# Patient Record
Sex: FEMALE | NOT HISPANIC OR LATINO | Employment: UNEMPLOYED | URBAN - METROPOLITAN AREA
[De-identification: names, ages, dates, MRNs, and addresses within clinical notes are randomized per-mention and may not be internally consistent; named-entity substitution may affect disease eponyms.]

---

## 2017-02-28 ENCOUNTER — COMMUNICATION - HEALTHEAST (OUTPATIENT)
Dept: TELEHEALTH | Facility: CLINIC | Age: 27
End: 2017-02-28

## 2017-02-28 ENCOUNTER — OFFICE VISIT - HEALTHEAST (OUTPATIENT)
Dept: FAMILY MEDICINE | Facility: CLINIC | Age: 27
End: 2017-02-28

## 2017-02-28 DIAGNOSIS — N75.1 BARTHOLIN'S GLAND ABSCESS: ICD-10-CM

## 2017-04-17 ENCOUNTER — AMBULATORY - HEALTHEAST (OUTPATIENT)
Dept: FAMILY MEDICINE | Facility: CLINIC | Age: 27
End: 2017-04-17

## 2017-04-17 ENCOUNTER — COMMUNICATION - HEALTHEAST (OUTPATIENT)
Dept: FAMILY MEDICINE | Facility: CLINIC | Age: 27
End: 2017-04-17

## 2017-04-17 DIAGNOSIS — R06.83 SNORING: ICD-10-CM

## 2017-04-18 ENCOUNTER — OFFICE VISIT - HEALTHEAST (OUTPATIENT)
Dept: FAMILY MEDICINE | Facility: CLINIC | Age: 27
End: 2017-04-18

## 2017-04-18 DIAGNOSIS — R06.83 SNORING: ICD-10-CM

## 2017-04-18 DIAGNOSIS — Z00.00 HEALTHCARE MAINTENANCE: ICD-10-CM

## 2017-05-15 ENCOUNTER — OFFICE VISIT - HEALTHEAST (OUTPATIENT)
Dept: SLEEP MEDICINE | Facility: CLINIC | Age: 27
End: 2017-05-15

## 2017-05-15 DIAGNOSIS — R06.83 SNORING: ICD-10-CM

## 2017-05-15 DIAGNOSIS — R29.818 SUSPECTED SLEEP APNEA: ICD-10-CM

## 2017-05-15 DIAGNOSIS — G47.10 HYPERSOMNIA, UNSPECIFIED: ICD-10-CM

## 2017-05-15 DIAGNOSIS — G47.8 SLEEP DYSFUNCTION WITH SLEEP STAGE DISTURBANCE: ICD-10-CM

## 2017-05-15 ASSESSMENT — MIFFLIN-ST. JEOR: SCORE: 1631.02

## 2017-05-30 ENCOUNTER — RECORDS - HEALTHEAST (OUTPATIENT)
Dept: SLEEP MEDICINE | Age: 27
End: 2017-05-30

## 2017-05-30 DIAGNOSIS — R06.83 SNORING: ICD-10-CM

## 2017-05-30 DIAGNOSIS — G47.10 HYPERSOMNIA, UNSPECIFIED: ICD-10-CM

## 2017-05-30 DIAGNOSIS — G47.8 OTHER SLEEP DISORDERS: ICD-10-CM

## 2017-05-30 DIAGNOSIS — G47.30 SLEEP APNEA, UNSPECIFIED: ICD-10-CM

## 2017-06-01 ENCOUNTER — COMMUNICATION - HEALTHEAST (OUTPATIENT)
Dept: SLEEP MEDICINE | Facility: CLINIC | Age: 27
End: 2017-06-01

## 2017-08-21 ENCOUNTER — COMMUNICATION - HEALTHEAST (OUTPATIENT)
Dept: FAMILY MEDICINE | Facility: CLINIC | Age: 27
End: 2017-08-21

## 2017-08-21 DIAGNOSIS — B85.0 HEAD LICE: ICD-10-CM

## 2017-12-14 ENCOUNTER — RECORDS - HEALTHEAST (OUTPATIENT)
Dept: ADMINISTRATIVE | Facility: OTHER | Age: 27
End: 2017-12-14

## 2018-01-23 ENCOUNTER — RECORDS - HEALTHEAST (OUTPATIENT)
Dept: ADMINISTRATIVE | Facility: OTHER | Age: 28
End: 2018-01-23

## 2018-03-02 ENCOUNTER — RECORDS - HEALTHEAST (OUTPATIENT)
Dept: ADMINISTRATIVE | Facility: OTHER | Age: 28
End: 2018-03-02

## 2018-03-03 ENCOUNTER — RECORDS - HEALTHEAST (OUTPATIENT)
Dept: ADMINISTRATIVE | Facility: OTHER | Age: 28
End: 2018-03-03

## 2018-03-04 ENCOUNTER — RECORDS - HEALTHEAST (OUTPATIENT)
Dept: ADMINISTRATIVE | Facility: OTHER | Age: 28
End: 2018-03-04

## 2018-08-05 ENCOUNTER — RECORDS - HEALTHEAST (OUTPATIENT)
Dept: ADMINISTRATIVE | Facility: OTHER | Age: 28
End: 2018-08-05

## 2018-08-07 ENCOUNTER — RECORDS - HEALTHEAST (OUTPATIENT)
Dept: ADMINISTRATIVE | Facility: OTHER | Age: 28
End: 2018-08-07

## 2018-09-20 ENCOUNTER — OFFICE VISIT - HEALTHEAST (OUTPATIENT)
Dept: FAMILY MEDICINE | Facility: CLINIC | Age: 28
End: 2018-09-20

## 2018-09-20 DIAGNOSIS — R00.0 TACHYCARDIA: ICD-10-CM

## 2018-09-20 DIAGNOSIS — E05.90 HYPERTHYROIDISM: ICD-10-CM

## 2018-09-20 LAB
ALBUMIN SERPL-MCNC: 3.4 G/DL (ref 3.5–5)
ALP SERPL-CCNC: 114 U/L (ref 45–120)
ALT SERPL W P-5'-P-CCNC: 25 U/L (ref 0–45)
ANION GAP SERPL CALCULATED.3IONS-SCNC: 10 MMOL/L (ref 5–18)
AST SERPL W P-5'-P-CCNC: 27 U/L (ref 0–40)
ATRIAL RATE - MUSE: 96 BPM
BILIRUB SERPL-MCNC: 0.4 MG/DL (ref 0–1)
BUN SERPL-MCNC: 7 MG/DL (ref 8–22)
CALCIUM SERPL-MCNC: 9.8 MG/DL (ref 8.5–10.5)
CHLORIDE BLD-SCNC: 103 MMOL/L (ref 98–107)
CO2 SERPL-SCNC: 23 MMOL/L (ref 22–31)
CREAT SERPL-MCNC: 0.54 MG/DL (ref 0.6–1.1)
DIASTOLIC BLOOD PRESSURE - MUSE: NORMAL MMHG
ERYTHROCYTE [DISTWIDTH] IN BLOOD BY AUTOMATED COUNT: 10.8 % (ref 11–14.5)
GFR SERPL CREATININE-BSD FRML MDRD: >60 ML/MIN/1.73M2
GLUCOSE BLD-MCNC: 147 MG/DL (ref 70–125)
HCT VFR BLD AUTO: 42.7 % (ref 35–47)
HGB BLD-MCNC: 14.5 G/DL (ref 12–16)
INTERPRETATION ECG - MUSE: NORMAL
MCH RBC QN AUTO: 27 PG (ref 27–34)
MCHC RBC AUTO-ENTMCNC: 34 G/DL (ref 32–36)
MCV RBC AUTO: 79 FL (ref 80–100)
P AXIS - MUSE: 59 DEGREES
PLATELET # BLD AUTO: 252 THOU/UL (ref 140–440)
PMV BLD AUTO: 8.2 FL (ref 7–10)
POTASSIUM BLD-SCNC: 4 MMOL/L (ref 3.5–5)
PR INTERVAL - MUSE: 118 MS
PROT SERPL-MCNC: 7.3 G/DL (ref 6–8)
QRS DURATION - MUSE: 86 MS
QT - MUSE: 344 MS
QTC - MUSE: 434 MS
R AXIS - MUSE: 49 DEGREES
RBC # BLD AUTO: 5.38 MILL/UL (ref 3.8–5.4)
SODIUM SERPL-SCNC: 136 MMOL/L (ref 136–145)
SYSTOLIC BLOOD PRESSURE - MUSE: NORMAL MMHG
T AXIS - MUSE: 54 DEGREES
T4 FREE SERPL-MCNC: 3.1 NG/DL (ref 0.7–1.8)
TSH SERPL DL<=0.005 MIU/L-ACNC: <0.01 UIU/ML (ref 0.3–5)
VENTRICULAR RATE- MUSE: 96 BPM
WBC: 6.4 THOU/UL (ref 4–11)

## 2018-09-21 ENCOUNTER — COMMUNICATION - HEALTHEAST (OUTPATIENT)
Dept: FAMILY MEDICINE | Facility: CLINIC | Age: 28
End: 2018-09-21

## 2018-10-01 ENCOUNTER — COMMUNICATION - HEALTHEAST (OUTPATIENT)
Dept: CARE COORDINATION | Facility: CLINIC | Age: 28
End: 2018-10-01

## 2018-10-14 ENCOUNTER — RECORDS - HEALTHEAST (OUTPATIENT)
Dept: ADMINISTRATIVE | Facility: OTHER | Age: 28
End: 2018-10-14

## 2018-10-14 ENCOUNTER — COMMUNICATION - HEALTHEAST (OUTPATIENT)
Dept: FAMILY MEDICINE | Facility: CLINIC | Age: 28
End: 2018-10-14

## 2018-10-14 DIAGNOSIS — E05.90 HYPERTHYROIDISM: ICD-10-CM

## 2018-10-17 ENCOUNTER — RECORDS - HEALTHEAST (OUTPATIENT)
Dept: ADMINISTRATIVE | Facility: OTHER | Age: 28
End: 2018-10-17

## 2018-11-11 ENCOUNTER — RECORDS - HEALTHEAST (OUTPATIENT)
Dept: ADMINISTRATIVE | Facility: OTHER | Age: 28
End: 2018-11-11

## 2018-11-13 ENCOUNTER — RECORDS - HEALTHEAST (OUTPATIENT)
Dept: ADMINISTRATIVE | Facility: OTHER | Age: 28
End: 2018-11-13

## 2018-11-24 ENCOUNTER — RECORDS - HEALTHEAST (OUTPATIENT)
Dept: ADMINISTRATIVE | Facility: OTHER | Age: 28
End: 2018-11-24

## 2018-11-27 ENCOUNTER — OFFICE VISIT - HEALTHEAST (OUTPATIENT)
Dept: FAMILY MEDICINE | Facility: CLINIC | Age: 28
End: 2018-11-27

## 2018-11-27 DIAGNOSIS — F41.1 GENERALIZED ANXIETY DISORDER: ICD-10-CM

## 2018-11-27 DIAGNOSIS — F33.1 MODERATE EPISODE OF RECURRENT MAJOR DEPRESSIVE DISORDER (H): ICD-10-CM

## 2018-11-27 DIAGNOSIS — F90.9 ATTENTION DEFICIT HYPERACTIVITY DISORDER (ADHD), UNSPECIFIED ADHD TYPE: ICD-10-CM

## 2018-11-27 DIAGNOSIS — E05.90 HYPERTHYROIDISM: ICD-10-CM

## 2018-12-27 ENCOUNTER — RECORDS - HEALTHEAST (OUTPATIENT)
Dept: ADMINISTRATIVE | Facility: OTHER | Age: 28
End: 2018-12-27

## 2019-01-28 ENCOUNTER — RECORDS - HEALTHEAST (OUTPATIENT)
Dept: ADMINISTRATIVE | Facility: OTHER | Age: 29
End: 2019-01-28

## 2019-04-10 ENCOUNTER — RECORDS - HEALTHEAST (OUTPATIENT)
Dept: ADMINISTRATIVE | Facility: OTHER | Age: 29
End: 2019-04-10

## 2019-05-31 ENCOUNTER — COMMUNICATION - HEALTHEAST (OUTPATIENT)
Dept: FAMILY MEDICINE | Facility: CLINIC | Age: 29
End: 2019-05-31

## 2019-05-31 DIAGNOSIS — E05.90 HYPERTHYROIDISM: ICD-10-CM

## 2019-07-16 ENCOUNTER — RECORDS - HEALTHEAST (OUTPATIENT)
Dept: ADMINISTRATIVE | Facility: OTHER | Age: 29
End: 2019-07-16

## 2019-07-26 ENCOUNTER — COMMUNICATION - HEALTHEAST (OUTPATIENT)
Dept: PHARMACY | Facility: CLINIC | Age: 29
End: 2019-07-26

## 2019-08-26 ENCOUNTER — COMMUNICATION - HEALTHEAST (OUTPATIENT)
Dept: PHARMACY | Facility: CLINIC | Age: 29
End: 2019-08-26

## 2019-09-16 ENCOUNTER — HOSPITAL ENCOUNTER (OUTPATIENT)
Dept: BEHAVIORAL HEALTH | Facility: CLINIC | Age: 29
Discharge: HOME | End: 2019-09-16
Attending: SOCIAL WORKER | Admitting: SOCIAL WORKER
Payer: COMMERCIAL

## 2019-09-16 VITALS
HEIGHT: 64 IN | DIASTOLIC BLOOD PRESSURE: 80 MMHG | BODY MASS INDEX: 29.02 KG/M2 | HEART RATE: 84 BPM | SYSTOLIC BLOOD PRESSURE: 119 MMHG | WEIGHT: 170 LBS

## 2019-09-16 PROCEDURE — H0001 ALCOHOL AND/OR DRUG ASSESS: HCPCS

## 2019-09-16 RX ORDER — TRAZODONE HYDROCHLORIDE 50 MG/1
50 TABLET, FILM COATED ORAL
COMMUNITY
Start: 2019-09-10

## 2019-09-16 RX ORDER — PROPRANOLOL HYDROCHLORIDE 20 MG/1
20 TABLET ORAL
COMMUNITY
Start: 2019-07-19

## 2019-09-16 RX ORDER — METHIMAZOLE 10 MG/1
10 TABLET ORAL
COMMUNITY
Start: 2019-07-19

## 2019-09-16 RX ORDER — DEXTROAMPHETAMINE SACCHARATE, AMPHETAMINE ASPARTATE, DEXTROAMPHETAMINE SULFATE AND AMPHETAMINE SULFATE 2.5; 2.5; 2.5; 2.5 MG/1; MG/1; MG/1; MG/1
10 TABLET ORAL
COMMUNITY

## 2019-09-16 RX ORDER — DEXTROAMPHETAMINE SACCHARATE, AMPHETAMINE ASPARTATE MONOHYDRATE, DEXTROAMPHETAMINE SULFATE AND AMPHETAMINE SULFATE 7.5; 7.5; 7.5; 7.5 MG/1; MG/1; MG/1; MG/1
30 CAPSULE, EXTENDED RELEASE ORAL
COMMUNITY

## 2019-09-16 RX ORDER — HYDROXYZINE PAMOATE 50 MG/1
50 CAPSULE ORAL
COMMUNITY
Start: 2019-07-19

## 2019-09-16 ASSESSMENT — ANXIETY QUESTIONNAIRES
1. FEELING NERVOUS, ANXIOUS, OR ON EDGE: NEARLY EVERY DAY
IF YOU CHECKED OFF ANY PROBLEMS ON THIS QUESTIONNAIRE, HOW DIFFICULT HAVE THESE PROBLEMS MADE IT FOR YOU TO DO YOUR WORK, TAKE CARE OF THINGS AT HOME, OR GET ALONG WITH OTHER PEOPLE: EXTREMELY DIFFICULT
6. BECOMING EASILY ANNOYED OR IRRITABLE: NEARLY EVERY DAY
3. WORRYING TOO MUCH ABOUT DIFFERENT THINGS: NEARLY EVERY DAY
GAD7 TOTAL SCORE: 19
7. FEELING AFRAID AS IF SOMETHING AWFUL MIGHT HAPPEN: NEARLY EVERY DAY
5. BEING SO RESTLESS THAT IT IS HARD TO SIT STILL: NEARLY EVERY DAY
2. NOT BEING ABLE TO STOP OR CONTROL WORRYING: SEVERAL DAYS
4. TROUBLE RELAXING: NEARLY EVERY DAY

## 2019-09-16 ASSESSMENT — MIFFLIN-ST. JEOR: SCORE: 1481.11

## 2019-09-16 ASSESSMENT — PAIN SCALES - GENERAL: PAINLEVEL: SEVERE PAIN (6)

## 2019-09-16 ASSESSMENT — PATIENT HEALTH QUESTIONNAIRE - PHQ9: SUM OF ALL RESPONSES TO PHQ QUESTIONS 1-9: 27

## 2019-09-16 NOTE — PROGRESS NOTES
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 lacks impulse control and long-term sober maintenance skills.  Pt is at a high risk for continued use.         DIMENSION VI - Recovery Environment   1. Are you employed/attending school? Tell me about that.     The last time she worked was in June 2019 for a week.    2A. Describe a typical day; evening for you. Work, school, social, leisure, volunteer, spiritual practices. Include time spent obtaining, using, recovering from drugs or alcohol. (DSM)     Current: \"sleeping.\"  Prior to hospitalization:  \"High.\"    Please describe what leisure activities have been associated with your substance abuse:     \"Stare at things, stare at the window.\"    2B. How often do you spend more time than you planned using or use more than you planned? (DSM)     It was never planned.    3. How important is using to your social connections? Do many of your family or friends use?     All of her friends still use. \"I don't really have any friends.\"    4A. Are you currently in a significant relationship?     No    4C. Sexual Orientation:     Bisexual    5A. Who do you live with?      Currently staying with her aunt, but she needs to move by Wednesday.    5B. Tell me about their alcohol/drug use and mental health issues.     No " "use.    5C. Are you concerned for your safety there? No    5D. Are you concerned about the safety of anyone else who lives with you? No    6A. Do you have children who live with you?     No    6B. Do you have children who do not live with you?     Yes- two sons 8 and 6. She no longer has CPS involvement. \"My ex- has rights. We never went to court.\"    7A. Who supports you in making changes in your alcohol or drug use? What are they willing to do to support you? Who is upset or angry about you making changes in your alcohol or drug use? How big a problem is this for you?      \"my mom.\"    7B. This table is provided to record information about the person s relationships and available support It is not necessary to ask each item; only to get a comprehensive picture of their support system.  How often can you count on the following people when you need someone?   Partner / Spouse The patient does not have a current partner or spouse.   Parent(s)/Aunt(s)/Uncle(s)/Grandparents Always supportive   Sibling(s)/Cousin(s) Rarely supportive   Child(ronaldo) Too young   Other relative(s) The patient doesn't have any other relatives.   Friend(s)/neighbor(s) The patient doesn't have any current contact with friends.   Child(ronaldo) s father(s)/mother(s) Rarely supportive   Support group member(s) The patient denied having any current involvement with 12-step or other support group meetings.   Community of dayana members The patient denied having any current involvement with community dayana members.   /counselor/therapist/healer The patient denied having any current involvement with a , counselor, therapist or healer.   Other (specify) No     8A. What is your current living situation?     She is homeless. She is currently staying at her aunt's house until Wednesday.    8B. What is your long term plan for where you will be living?     She wants to get her own house.    8C. Tell me about your living " "environment/neighborhood? Ask enough follow up questions to determine safety, criminal activity, availability of alcohol and drugs, supportive or antagonistic to the person making changes.      She is homeless.    9. Criminal justice history: Gather current/recent history and any significant history related to substance use--Arrests? Convictions? Circumstances? Alcohol or drug involvement? Sentences? Still on probation or parole? Expectations of the court? Current court order? Any sex offenses - lifetime? What level? (DSM)    None    10. What obstacles exist to participating in treatment? (Time off work, childcare, funding, transportation, pending [facility] time, living situation)     The patient denied having any obstacles for participating in substance abuse treatment.    Dimension VI Ratings   Recovery environment - The placing authority must use the criteria in Dimension VI to determine a client s recovery environment.   RISK DESCRIPTIONS - Severity ratin Client has (A) Chronically antagonistic significant other, living environment, family, peer group or long-term criminal justice involvement that is harmful to recovery or treatment progress, or (B) Client has an actively antagonistic significant other, family, work, or living environment with immediate threat to the client's safety and well-being.    REASONS SEVERITY WAS ASSIGNED - (What support does the person have for making changes? What structure/stability does the person have in his or her daily life that will increase the likelihood that changes can be sustained? What problems exist in the person s environment that will jeopardize getting/staying clean and sober?)     The patient is currently homeless. She is staying with her mom at her aunt's house and they both have to move out on Wednesday. The patient reported having a relationship conflict with \"my mom, my kid's dad, my ex\" due to her ongoing substance abuse issues. The patient identified as being " bisexual and she denied being in a romantic relationship at this time. The patient denied having a history of legal issues. The patient is unemployed and she last worked in June 2019 for a week. She realized she couldn't work due to her drug use. The patient reported having some increased financial stress due to not working. The patient lacks a current sober peer support network.         Client Choice/Exceptions   Would you like services specific to language, age, gender, culture, Voodoo preference, race, ethnicity, sexual orientation or disability?  Yes - [facility]    What particular treatment choices and options would you like to have? [facility] IP    Do you have a preference for a particular treatment program? [facility]    Criteria for Diagnosis     Criteria for Diagnosis  DSM-5 Criteria for Substance Use Disorder  Instructions: Determine whether the client currently meets the criteria for Substance Use Disorder using the diagnostic criteria in the DSM-V pp.481-589. Current means during the most recent 12 months outside a facility that controls access to substances    Category of Substance Severity (ICD-10 Code / DSM 5 Code)     Alcohol Use Disorder Severe  (10.20) (303.90)   Cannabis Use Disorder Severe   (F12.20) (304.30)   Hallucinogen Use Disorder The patient does not meet the criteria for a Hallucinogen use disorder.   Inhalant Use Disorder The patient does not meet the criteria for an Inhalant use disorder.   Opioid Use Disorder The patient does not meet the criteria for an Opioid use disorder.   Sedative, Hypnotic, or Anxiolytic Use Disorder The patient does not meet the criteria for a Sedative/Hypnotic use disorder.   Stimulant Related Disorder Severe   (F15.20) (304.40) Amphetamine type substance   Tobacco Use Disorder Moderate   (F17.200) (305.1)   Other (or unknown) Substance Use Disorder The patient does not meet the criteria for a Other (or unknown) Substance use disorder.       Collateral Contact  Summary   Number of contacts made: 1    Contact with referring person:  No    If court related records were reviewed, summarize here: No court records had been reviewed at the time of this documentation.    Information from collateral contacts supported/largely agreed with information from the client and associated risk ratings.      Rule 25 Assessment Summary and Plan   's Recommendation    1)  Complete a [facility] residential based or similar treatment program, such as [facility]Occ[facility].   2)  Abstain from all mood-altering chemicals unless prescribed by a licensed provider.   3)  Attend, at minimum, 2 weekly support group meetings, such as 12 step based (AA/NA), SMART Recovery, Health Realizations, and/or Refuge Recovery meetings.     4)  Actively work with a female mentor/sponsor on a weekly basis.   5)  Follow all the recommendations of your treatment/medical providers.      Collateral Contacts     Name:    Mell French   Relationship:    mother   Phone Number:    [phone] Releases:    Yes     Mell filled out the Concerned Person Information Sheet. She has been concerned about pt's drug use for the past year. She has been concerned about pt's mental health since she was 12 years old.  She doesn't know any specifics about pt's meth use.  ollateral Contacts      A problematic pattern of alcohol/drug use leading to clinically significant impairment or distress, as manifested by at least two of the following, occurring within a 12-month period:    1.) Alcohol/drug is often taken in larger amounts or over a longer period than was intended.  2.) There is a persistent desire or unsuccessful efforts to cut down or control alcohol/drug use  3.) A great deal of time is spent in activities necessary to obtain alcohol, use alcohol, or recover from its effects.  4.) Craving, or a strong desire or urge to use alcohol/drug  5.) Recurrent alcohol/drug use resulting in a failure to fulfill  major role obligations at work, school or home.  6.) Continued alcohol use despite having persistent or recurrent social or interpersonal problems caused or exacerbated by the effects of alcohol/drug.  7.) Important social, occupational, or recreational activities are given up or reduced because of alcohol/drug use.  8.) Recurrent alcohol/drug use in situations in which it is physically hazardous.  9.) Alcohol/drug use is continued despite knowledge of having a persistent or recurrent physical or psychological problem that is likely to have been caused or exacerbated by alcohol.  10.) Tolerance, as defined by either of the following: A need for markedly increased amounts of alcohol/drug to achieve intoxication or desired effect.  11.) Withdrawal, as manifested by either of the following: The characteristic withdrawal syndrome for alcohol/drug (refer to Criteria A and B of the criteria set for alcohol/drug withdrawal).      Specify if: In early remission:  After full criteria for alcohol/drug use disorder were previously met, none of the criteria for alcohol/drug use disorder have been met for at least 3 months but for less than 12 months (with the exception that Criterion A4,  Craving or a strong desire or urge to use alcohol/drug  may be met).     In sustained remission:   After full criteria for alcohol use disorder were previously met, non of the criteria for alcohol/drug use disorder have been met at any time during a period of 12 months or longer (with the exception that Criterion A4,  Craving or strong desire or urge to use alcohol/drug  may be met).   Specify if:   This additional specifier is used if the individual is in an environment where access to alcohol is restricted.    Mild: Presence of 2-3 symptoms  Moderate: Presence of 4-5 symptoms  Severe: Presence of 6 or more symptoms
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
## 2019-09-17 ASSESSMENT — ANXIETY QUESTIONNAIRES: GAD7 TOTAL SCORE: 19

## 2019-09-22 ENCOUNTER — HOSPITAL ENCOUNTER (EMERGENCY)
Facility: CLINIC | Age: 29
Discharge: HOME | End: 2019-09-22
Attending: FAMILY MEDICINE | Admitting: FAMILY MEDICINE
Payer: COMMERCIAL

## 2019-09-22 VITALS
DIASTOLIC BLOOD PRESSURE: 78 MMHG | TEMPERATURE: 98.1 F | SYSTOLIC BLOOD PRESSURE: 108 MMHG | RESPIRATION RATE: 16 BRPM | OXYGEN SATURATION: 95 % | HEART RATE: 84 BPM

## 2019-09-22 DIAGNOSIS — E05.00 GRAVES' DISEASE: ICD-10-CM

## 2019-09-22 DIAGNOSIS — R11.2 NAUSEA AND VOMITING, INTRACTABILITY OF VOMITING NOT SPECIFIED, UNSPECIFIED VOMITING TYPE: ICD-10-CM

## 2019-09-22 LAB
ALBUMIN SERPL-MCNC: 2.9 G/DL (ref 3.4–5)
ALBUMIN UR-MCNC: NEGATIVE MG/DL
ALP SERPL-CCNC: 147 U/L (ref 40–150)
ALT SERPL W P-5'-P-CCNC: 31 U/L (ref 0–50)
ANION GAP SERPL CALCULATED.3IONS-SCNC: 7 MMOL/L (ref 3–14)
APPEARANCE UR: CLEAR
AST SERPL W P-5'-P-CCNC: 23 U/L (ref 0–45)
BASOPHILS # BLD AUTO: 0 10E9/L (ref 0–0.2)
BASOPHILS NFR BLD AUTO: 0.4 %
BILIRUB SERPL-MCNC: 0.2 MG/DL (ref 0.2–1.3)
BILIRUB UR QL STRIP: NEGATIVE
BUN SERPL-MCNC: 7 MG/DL (ref 7–30)
CALCIUM SERPL-MCNC: 8.5 MG/DL (ref 8.5–10.1)
CHLORIDE SERPL-SCNC: 106 MMOL/L (ref 94–109)
CO2 SERPL-SCNC: 27 MMOL/L (ref 20–32)
COLOR UR AUTO: YELLOW
CREAT SERPL-MCNC: 0.46 MG/DL (ref 0.52–1.04)
DIFFERENTIAL METHOD BLD: NORMAL
EOSINOPHIL # BLD AUTO: 0.4 10E9/L (ref 0–0.7)
EOSINOPHIL NFR BLD AUTO: 5.1 %
ERYTHROCYTE [DISTWIDTH] IN BLOOD BY AUTOMATED COUNT: 12.4 % (ref 10–15)
GFR SERPL CREATININE-BSD FRML MDRD: >90 ML/MIN/{1.73_M2}
GLUCOSE SERPL-MCNC: 89 MG/DL (ref 70–99)
GLUCOSE UR STRIP-MCNC: NEGATIVE MG/DL
HCG UR QL: NEGATIVE
HCT VFR BLD AUTO: 37.9 % (ref 35–47)
HGB BLD-MCNC: 12.4 G/DL (ref 11.7–15.7)
HGB UR QL STRIP: ABNORMAL
IMM GRANULOCYTES # BLD: 0 10E9/L (ref 0–0.4)
IMM GRANULOCYTES NFR BLD: 0.2 %
KETONES UR STRIP-MCNC: NEGATIVE MG/DL
LEUKOCYTE ESTERASE UR QL STRIP: NEGATIVE
LIPASE SERPL-CCNC: 231 U/L (ref 73–393)
LYMPHOCYTES # BLD AUTO: 2.6 10E9/L (ref 0.8–5.3)
LYMPHOCYTES NFR BLD AUTO: 31.6 %
MCH RBC QN AUTO: 27.2 PG (ref 26.5–33)
MCHC RBC AUTO-ENTMCNC: 32.7 G/DL (ref 31.5–36.5)
MCV RBC AUTO: 83 FL (ref 78–100)
MONOCYTES # BLD AUTO: 0.8 10E9/L (ref 0–1.3)
MONOCYTES NFR BLD AUTO: 9.4 %
MUCOUS THREADS #/AREA URNS LPF: PRESENT /LPF
NEUTROPHILS # BLD AUTO: 4.4 10E9/L (ref 1.6–8.3)
NEUTROPHILS NFR BLD AUTO: 53.3 %
NITRATE UR QL: NEGATIVE
NRBC # BLD AUTO: 0 10*3/UL
NRBC BLD AUTO-RTO: 0 /100
PH UR STRIP: 5.5 PH (ref 5–7)
PLATELET # BLD AUTO: 257 10E9/L (ref 150–450)
POTASSIUM SERPL-SCNC: 3.9 MMOL/L (ref 3.4–5.3)
PROT SERPL-MCNC: 7.2 G/DL (ref 6.8–8.8)
RBC # BLD AUTO: 4.56 10E12/L (ref 3.8–5.2)
RBC #/AREA URNS AUTO: 3 /HPF (ref 0–2)
SODIUM SERPL-SCNC: 140 MMOL/L (ref 133–144)
SOURCE: ABNORMAL
SP GR UR STRIP: 1.02 (ref 1–1.03)
SQUAMOUS #/AREA URNS AUTO: 9 /HPF (ref 0–1)
T4 FREE SERPL-MCNC: 1.63 NG/DL (ref 0.76–1.46)
TSH SERPL DL<=0.005 MIU/L-ACNC: <0.01 MU/L (ref 0.4–4)
UROBILINOGEN UR STRIP-MCNC: NORMAL MG/DL (ref 0–2)
WBC # BLD AUTO: 8.3 10E9/L (ref 4–11)
WBC #/AREA URNS AUTO: 1 /HPF (ref 0–5)

## 2019-09-22 PROCEDURE — 99284 EMERGENCY DEPT VISIT MOD MDM: CPT | Mod: 25

## 2019-09-22 PROCEDURE — 25000128 H RX IP 250 OP 636: Performed by: FAMILY MEDICINE

## 2019-09-22 PROCEDURE — 83690 ASSAY OF LIPASE: CPT | Performed by: FAMILY MEDICINE

## 2019-09-22 PROCEDURE — 81025 URINE PREGNANCY TEST: CPT | Performed by: FAMILY MEDICINE

## 2019-09-22 PROCEDURE — 84439 ASSAY OF FREE THYROXINE: CPT | Performed by: FAMILY MEDICINE

## 2019-09-22 PROCEDURE — 99284 EMERGENCY DEPT VISIT MOD MDM: CPT | Mod: Z6 | Performed by: FAMILY MEDICINE

## 2019-09-22 PROCEDURE — 85025 COMPLETE CBC W/AUTO DIFF WBC: CPT | Performed by: FAMILY MEDICINE

## 2019-09-22 PROCEDURE — 84443 ASSAY THYROID STIM HORMONE: CPT | Performed by: FAMILY MEDICINE

## 2019-09-22 PROCEDURE — 96361 HYDRATE IV INFUSION ADD-ON: CPT

## 2019-09-22 PROCEDURE — 81001 URINALYSIS AUTO W/SCOPE: CPT | Performed by: FAMILY MEDICINE

## 2019-09-22 PROCEDURE — 96374 THER/PROPH/DIAG INJ IV PUSH: CPT

## 2019-09-22 PROCEDURE — 25800030 ZZH RX IP 258 OP 636

## 2019-09-22 PROCEDURE — 80053 COMPREHEN METABOLIC PANEL: CPT | Performed by: FAMILY MEDICINE

## 2019-09-22 RX ORDER — ONDANSETRON 2 MG/ML
4 INJECTION INTRAMUSCULAR; INTRAVENOUS EVERY 30 MIN PRN
Status: DISCONTINUED | OUTPATIENT
Start: 2019-09-22 | End: 2019-09-22 | Stop reason: HOSPADM

## 2019-09-22 RX ORDER — LIDOCAINE 40 MG/G
CREAM TOPICAL
Status: DISCONTINUED | OUTPATIENT
Start: 2019-09-22 | End: 2019-09-22 | Stop reason: HOSPADM

## 2019-09-22 RX ORDER — ONDANSETRON 4 MG/1
4 TABLET, ORALLY DISINTEGRATING ORAL EVERY 8 HOURS PRN
Qty: 10 TABLET | Refills: 0 | Status: SHIPPED | OUTPATIENT
Start: 2019-09-22 | End: 2019-09-25

## 2019-09-22 RX ORDER — SODIUM CHLORIDE 9 MG/ML
1000 INJECTION, SOLUTION INTRAVENOUS CONTINUOUS
Status: DISCONTINUED | OUTPATIENT
Start: 2019-09-22 | End: 2019-09-22 | Stop reason: HOSPADM

## 2019-09-22 RX ORDER — PROPRANOLOL HYDROCHLORIDE 20 MG/1
20 TABLET ORAL DAILY
Qty: 30 TABLET | Refills: 0 | Status: SHIPPED | OUTPATIENT
Start: 2019-09-22

## 2019-09-22 RX ORDER — METHIMAZOLE 10 MG/1
10 TABLET ORAL DAILY
Qty: 30 TABLET | Refills: 0 | Status: SHIPPED | OUTPATIENT
Start: 2019-09-22

## 2019-09-22 RX ORDER — SODIUM CHLORIDE 9 MG/ML
INJECTION, SOLUTION INTRAVENOUS
Status: COMPLETED
Start: 2019-09-22 | End: 2019-09-22

## 2019-09-22 RX ADMIN — SODIUM CHLORIDE 1000 ML: 9 INJECTION, SOLUTION INTRAVENOUS at 10:51

## 2019-09-22 RX ADMIN — ONDANSETRON 4 MG: 2 INJECTION INTRAMUSCULAR; INTRAVENOUS at 10:51

## 2019-09-22 RX ADMIN — Medication 1000 ML: at 10:51

## 2019-09-22 ASSESSMENT — ENCOUNTER SYMPTOMS
NAUSEA: 1
SORE THROAT: 0
APPETITE CHANGE: 1
VOICE CHANGE: 0
ACTIVITY CHANGE: 1
ABDOMINAL PAIN: 1
FATIGUE: 1
FREQUENCY: 0
BRUISES/BLEEDS EASILY: 0
DYSPHORIC MOOD: 1
DIARRHEA: 1
HALLUCINATIONS: 0
DYSURIA: 0
FEVER: 0
VOMITING: 1
WEAKNESS: 1
DECREASED CONCENTRATION: 1
TROUBLE SWALLOWING: 0
HEMATURIA: 0
SHORTNESS OF BREATH: 0
NECK PAIN: 0

## 2019-09-22 NOTE — ED PROVIDER NOTES
[facility] [place])    9/22/19       History     Chief Complaint   Patient presents with     Abdominal Pain     n&V for past two days.  LBM yesterday - loose.  Pt stated she has not had period for 2 months and says there is a slim chance she could be pregnant.  Says she has gained weight over past month, but is also in treatment for meth use and last used about 3 weeks ago.     The history is provided by the patient and medical records.     Lolly Chang is a 29 year old female who presents with abdominal pain for past few days along with nausea, vomiting, diarrhea.  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  She developed abdominal pain, nausea and vomiting over the past 2 days.  She states that her abdominal pain is localized to her lower abdomen but also epigastric abdomen from vomiting.  She also had loose stools yesterday.  She notes having no periods for the past 2 months, but did have her tubes tied.  She did have recent antibiotics for treatment of UTI.  No dysuria currently. she notes recent UTI that was treated with antibiotics. no cough, chest pain shortness of breath. Patient states that she is out of her Graves' disease medications, cannot get refills until she sees a specialist but won't be able to see them until November.     I have reviewed the Medications, Allergies, Past Medical and Surgical History, and Social History in the Epic system.  No past medical history on file.    No past surgical history on file.      Social History     Tobacco Use     Smoking status: Current Every Day Smoker     Packs/day: 1.00     Types: Cigarettes     Smokeless tobacco: Never Used   Substance Use Topics     Alcohol use: Not on file      Review of Systems   Constitutional: Positive for activity change, appetite change and fatigue. Negative for fever.   HENT: Negative for sore throat, trouble swallowing and voice change.    Eyes: Negative for  visual disturbance.   Respiratory: Negative for shortness of breath.    Cardiovascular: Negative for chest pain.   Gastrointestinal: Positive for abdominal pain, diarrhea, nausea and vomiting.   Genitourinary: Positive for menstrual problem (missed periods for past 2 months). Negative for dysuria, frequency, hematuria, urgency and vaginal discharge.   Musculoskeletal: Negative for neck pain.   Skin: Negative for rash.   Allergic/Immunologic: Negative for immunocompromised state.   Neurological: Positive for weakness (general). Negative for syncope.   Hematological: Does not bruise/bleed easily.   Psychiatric/Behavioral: Positive for decreased concentration and dysphoric mood. Negative for hallucinations.   All other systems reviewed and are negative.      Physical Exam   BP: 133/57  Pulse: 110  Temp: 98.6  F (37  C)  Resp: 18  SpO2: 97 %      Physical Exam  Vitals signs and nursing note reviewed.   Constitutional:       General: She is in acute distress.      Appearance: She is well-developed. She is not ill-appearing or diaphoretic.   HENT:      Head: Normocephalic and atraumatic.   Eyes:      General: No scleral icterus.     Extraocular Movements: Extraocular movements intact.      Pupils: Pupils are equal, round, and reactive to light.      Comments: Exophthalmos noted   Neck:      Musculoskeletal: Normal range of motion and neck supple.   Cardiovascular:      Rate and Rhythm: Normal rate.      Heart sounds: No murmur. No friction rub.   Pulmonary:      Effort: No respiratory distress.      Breath sounds: No stridor.   Abdominal:      General: There is no distension. There are no signs of injury.      Tenderness: There is tenderness (mild nonfocaal).   Musculoskeletal:         General: No swelling or deformity.   Skin:     General: Skin is warm and dry.      Capillary Refill: Capillary refill takes less than 2 seconds.      Coloration: Skin is not jaundiced or pale.      Findings: No erythema or rash.    Neurological:      General: No focal deficit present.      Mental Status: She is alert and oriented to person, place, and time.   Psychiatric:         Mood and Affect: Mood normal.         ED Course        Procedures         Patient evaluated in the ER.  IV established liter normal saline given.  Patient received Zofran for nausea.  CBC chemistries otherwise normal urinalysis negative for infection negative pregnancy test.  Lipase within normal limits.  Patient feeling better able to eat etc.  TSH and T4 noted.  Patient would like to go at this point we did prescribe her Zofran as needed for nausea also order for propranolol and methimazole and placed a referral and phone number to the endocrinology clinic at the [facility] for to follow-up with this she agrees with plan and will be discharged.       Critical Care time:  none             Labs Ordered and Resulted from Time of ED Arrival Up to the Time of Departure from the ED   COMPREHENSIVE METABOLIC PANEL - Abnormal; Notable for the following components:       Result Value    Creatinine 0.46 (*)     Albumin 2.9 (*)     All other components within normal limits   ROUTINE UA WITH MICROSCOPIC REFLEX TO CULTURE - Abnormal; Notable for the following components:    Blood Urine Trace (*)     RBC Urine 3 (*)     Squamous Epithelial /HPF Urine 9 (*)     Mucous Urine Present (*)     All other components within normal limits   TSH WITH FREE T4 REFLEX - Abnormal; Notable for the following components:    TSH <0.01 (*)     All other components within normal limits   T4 FREE - Abnormal; Notable for the following components:    T4 Free 1.63 (*)     All other components within normal limits   CBC WITH PLATELETS DIFFERENTIAL   LIPASE   HCG QUALITATIVE URINE   T4 FREE   PERIPHERAL IV CATHETER     Results for orders placed or performed during the hospital encounter of 09/22/19   CBC with platelets differential   Result Value Ref Range    WBC 8.3 4.0 - 11.0 10e9/L    RBC Count 4.56  3.8 - 5.2 10e12/L    Hemoglobin 12.4 11.7 - 15.7 g/dL    Hematocrit 37.9 35.0 - 47.0 %    MCV 83 78 - 100 fl    MCH 27.2 26.5 - 33.0 pg    MCHC 32.7 31.5 - 36.5 g/dL    RDW 12.4 10.0 - 15.0 %    Platelet Count 257 150 - 450 10e9/L    Diff Method Automated Method     % Neutrophils 53.3 %    % Lymphocytes 31.6 %    % Monocytes 9.4 %    % Eosinophils 5.1 %    % Basophils 0.4 %    % Immature Granulocytes 0.2 %    Nucleated RBCs 0 0 /100    Absolute Neutrophil 4.4 1.6 - 8.3 10e9/L    Absolute Lymphocytes 2.6 0.8 - 5.3 10e9/L    Absolute Monocytes 0.8 0.0 - 1.3 10e9/L    Absolute Eosinophils 0.4 0.0 - 0.7 10e9/L    Absolute Basophils 0.0 0.0 - 0.2 10e9/L    Abs Immature Granulocytes 0.0 0 - 0.4 10e9/L    Absolute Nucleated RBC 0.0    Comprehensive metabolic panel   Result Value Ref Range    Sodium 140 133 - 144 mmol/L    Potassium 3.9 3.4 - 5.3 mmol/L    Chloride 106 94 - 109 mmol/L    Carbon Dioxide 27 20 - 32 mmol/L    Anion Gap 7 3 - 14 mmol/L    Glucose 89 70 - 99 mg/dL    Urea Nitrogen 7 7 - 30 mg/dL    Creatinine 0.46 (L) 0.52 - 1.04 mg/dL    GFR Estimate >90 >60 mL/min/[1.73_m2]    GFR Estimate If Black >90 >60 mL/min/[1.73_m2]    Calcium 8.5 8.5 - 10.1 mg/dL    Bilirubin Total 0.2 0.2 - 1.3 mg/dL    Albumin 2.9 (L) 3.4 - 5.0 g/dL    Protein Total 7.2 6.8 - 8.8 g/dL    Alkaline Phosphatase 147 40 - 150 U/L    ALT 31 0 - 50 U/L    AST 23 0 - 45 U/L   Lipase   Result Value Ref Range    Lipase 231 73 - 393 U/L   HCG qualitative urine   Result Value Ref Range    HCG Qual Urine Negative NEG^Negative   UA with Microscopic reflex to Culture   Result Value Ref Range    Color Urine Yellow     Appearance Urine Clear     Glucose Urine Negative NEG^Negative mg/dL    Bilirubin Urine Negative NEG^Negative    Ketones Urine Negative NEG^Negative mg/dL    Specific Gravity Urine 1.018 1.003 - 1.035    Blood Urine Trace (A) NEG^Negative    pH Urine 5.5 5.0 - 7.0 pH    Protein Albumin Urine Negative NEG^Negative mg/dL    Urobilinogen  mg/dL Normal 0.0 - 2.0 mg/dL    Nitrite Urine Negative NEG^Negative    Leukocyte Esterase Urine Negative NEG^Negative    Source Midstream Urine     WBC Urine 1 0 - 5 /HPF    RBC Urine 3 (H) 0 - 2 /HPF    Squamous Epithelial /HPF Urine 9 (H) 0 - 1 /HPF    Mucous Urine Present (A) NEG^Negative /LPF   TSH with free T4 reflex   Result Value Ref Range    TSH <0.01 (L) 0.40 - 4.00 mU/L   T4 free   Result Value Ref Range    T4 Free 1.63 (H) 0.76 - 1.46 ng/dL            Assessments & Plan (with Medical Decision Making)  29-year-old female who is not pregnant presents ER with abdominal pain some nausea vomiting.  She also has history of Graves' disease.  She is out of her medications as of today.  She normally takes propranolol methimazole once a day.  Patient received IV fluids labs otherwise stable her abdomen exam otherwise negative.  Zofran given patient eating feeling better comfortably discharge her mother for prescriptions Zofran methimazole and propranolol referral to endocrine clinic patient should follow-up most likely this point questionable viral abdominal pain unclear etiology.           I have reviewed the nursing notes.    I have reviewed the findings, diagnosis, plan and need for follow up with the patient.    Discharge Medication List as of 9/22/2019  2:23 PM      START taking these medications    Details   !! methimazole (TAPAZOLE) 10 MG tablet Take 1 tablet (10 mg) by mouth daily, Disp-30 tablet, R-0, Local Print      ondansetron (ZOFRAN ODT) 4 MG ODT tab Take 1 tablet (4 mg) by mouth every 8 hours as needed for nausea, Disp-10 tablet, R-0, Local Print      !! propranolol (INDERAL) 20 MG tablet Take 1 tablet (20 mg) by mouth daily, Disp-30 tablet, R-0, Local Print       !! - Potential duplicate medications found. Please discuss with provider.          Final diagnoses:   Nausea and vomiting, intractability of vomiting not specified, unspecified vomiting type   Graves' disease     I, Araseli Hollins, am  serving as a trained medical scribe to document services personally performed by Dex Ford MD based on the provider's statements to me on September 22, 2019.  This document has been checked and approved by the attending provider.    I, Dex Ford MD, was physically present and have reviewed and verified the accuracy of this note documented by Araseli Hollins, medical scribe.     9/22/2019   [facility] EMERGENCY DEPARTMENT    This note was created at least in part by the use of dragon voice dictation system. Inadvertent typographical errors may still exist.  Dex Ford MD.         Dex Ford MD  09/22/19 [id number]

## 2019-09-22 NOTE — DISCHARGE INSTRUCTIONS
Home.  Take the zofran for nausea.  Follow up with [facility] please call for appointment:  [phone]   Take your propranolol and methimazole  Return if concerns.

## 2019-09-22 NOTE — ED AVS SNAPSHOT
[facility], [place], Emergency Department  [address]  [facility] [address]  Phone:  [phone]  Fax:  [phone]                                    Lolly Chang   MRN: [id number]    Department:  [facility], Emergency Department   Date of Visit:  9/22/2019           After Visit Summary Signature Page    I have received my discharge instructions, and my questions have been answered. I have discussed any challenges I see with this plan with the nurse or doctor.    ..........................................................................................................................................  Patient/Patient Representative Signature      ..........................................................................................................................................  Patient Representative Print Name and Relationship to Patient    ..................................................               ................................................  Date                                   Time    ..........................................................................................................................................  Reviewed by Signature/Title    ...................................................              ..............................................  Date                                               Time          22EPIC Rev 08/18

## 2019-09-23 ENCOUNTER — TELEPHONE (OUTPATIENT)
Dept: ENDOCRINOLOGY | Facility: CLINIC | Age: 29
End: 2019-09-23

## 2019-09-23 NOTE — TELEPHONE ENCOUNTER
To schedulers : please schedule with consult service (or open MARILYN) within 2 weeks.    Lore Daniel MD  Endocrine triage    [facility] Call Center    Phone Message    May a detailed message be left on voicemail: yes    Reason for Call: Other: Referral received on chart from [place] [facility] for Graves disease      Action Taken: Message routed to:  [facility] Endocrinology

## 2019-09-23 NOTE — TELEPHONE ENCOUNTER
[facility] Call Center    Phone Message    May a detailed message be left on voicemail: yes    Reason for Call: Other: Patient called back in regards to her referral from Dr. Ford for Graves disease. Please review and contact patient. Phone number is patient's mother's number, gave verbal OK to discuss with her.      Action Taken: Message routed to:  [facility] ([facility] Endo

## 2019-10-09 ENCOUNTER — RECORDS - HEALTHEAST (OUTPATIENT)
Dept: ADMINISTRATIVE | Facility: OTHER | Age: 29
End: 2019-10-09

## 2019-10-22 ENCOUNTER — RECORDS - HEALTHEAST (OUTPATIENT)
Dept: ADMINISTRATIVE | Facility: OTHER | Age: 29
End: 2019-10-22

## 2020-11-13 ENCOUNTER — HOSPITAL ENCOUNTER (OUTPATIENT)
Dept: RADIOLOGY | Facility: CLINIC | Age: 30
Discharge: HOME | End: 2020-11-13

## 2020-11-13 DIAGNOSIS — R52 PAIN: ICD-10-CM

## 2020-11-22 ENCOUNTER — RECORDS - HEALTHEAST (OUTPATIENT)
Dept: ADMINISTRATIVE | Facility: OTHER | Age: 30
End: 2020-11-22

## 2020-11-28 ENCOUNTER — RECORDS - HEALTHEAST (OUTPATIENT)
Dept: ADMINISTRATIVE | Facility: OTHER | Age: 30
End: 2020-11-28

## 2021-05-29 NOTE — TELEPHONE ENCOUNTER
RN cannot approve Refill Request    RN can NOT refill this medication med is not covered by policy/route to provider. Last office visit: 11/27/2018 Ilene Wray MD Last Physical: Visit date not found Last MTM visit: Visit date not found Last visit same specialty: 11/27/2018 Ilene Wray MD.  Next visit within 3 mo: Visit date not found  Next physical within 3 mo: Visit date not found      Estrella Nevarez, [facility] Connection Triage/Med Refill 6/1/2019    Requested Prescriptions   Pending Prescriptions Disp Refills     methIMAzole (TAPAZOLE) 5 MG tablet [Pharmacy Med Name: METHIMAZOLE 5MG TABLETS] 270 tablet 0     Sig: TAKE 1 TABLET(5 MG) BY MOUTH THREE TIMES DAILY       There is no refill protocol information for this order

## 2021-05-30 VITALS — WEIGHT: 200.6 LBS | BODY MASS INDEX: 33.9 KG/M2

## 2021-05-30 VITALS — WEIGHT: 202.4 LBS | HEIGHT: 65 IN | BODY MASS INDEX: 33.72 KG/M2

## 2021-05-30 VITALS — WEIGHT: 202.8 LBS | BODY MASS INDEX: 34.27 KG/M2

## 2021-06-01 ENCOUNTER — RECORDS - HEALTHEAST (OUTPATIENT)
Dept: ADMINISTRATIVE | Facility: CLINIC | Age: 31
End: 2021-06-01

## 2021-06-01 NOTE — TELEPHONE ENCOUNTER
Received MTM referral from patient's insurance ( Medicaid)     Attempt: 3, 9/4/19  Result: LM    Thank you for the referral,    Chani Day, MTM Coordinator Intern

## 2021-06-02 VITALS — BODY MASS INDEX: 30.38 KG/M2 | WEIGHT: 177 LBS

## 2021-06-02 VITALS — WEIGHT: 170.06 LBS | BODY MASS INDEX: 29.19 KG/M2

## 2021-06-09 NOTE — PROGRESS NOTES
ASSESSMENT:  1. Bartholin's gland abscess  - Incision and drainage  - Culture, Wound    PLAN:  There are no Patient Instructions on file for this visit.    Orders Placed This Encounter   Procedures     Incision and drainage     This order was created via procedure documentation     Culture, Wound     Medications Discontinued During This Encounter   Medication Reason     multivitamin therapeutic (THERAGRAN) tablet Therapy completed     STRATTERA 40 mg capsule Therapy completed     lisdexamfetamine (VYVANSE) 40 MG capsule Therapy completed     SERTRALINE HCL (ZOLOFT ORAL) Therapy completed     hydrocortisone (ANUSOL-HC) 25 mg suppository Therapy completed       No Follow-up on file.     Patient tolerated procedure without complications; will get a culture lab. Will prescribe antibiotics. Encouraged patient to do sitz baths twice per day. Reviewed medications with patient, and medications were reconciled.     CHIEF COMPLAINT:  Chief Complaint   Patient presents with     Groin Swelling     lump in vaginal area x 4-5 days painful       HISTORY OF PRESENT ILLNESS:  Lolly is a 26 y.o. female presenting to the clinic today or evaluation of a lump in her vaginal area. She has had a painful lump on her labia for 4 to 5 days. The lump is red. She denies any discharges from the lump. She has done hot baths and applying hot wash cloths to the area without improvement. She has not had a similar lump before. She does not feel ill. She denies fever and chills.     Depression/Anxiety: She takes venlafaxine. She does not need a medication refill.     ADHD: She takes Adderall. She does not need a medication refill.     REVIEW OF SYSTEMS:   Comprehensive review of systems negative except as noted above.    PFSH:  Family/Social: Her kids are doing well.    Reviewed, as below.     TOBACCO USE:  History   Smoking Status     Current Every Day Smoker   Smokeless Tobacco     Not on file       VITALS:  Vitals:    02/28/17 1049   BP: 128/70    Patient Site: Left Arm   Patient Position: Sitting   Cuff Size: Adult Large   Pulse: 76   Weight: 200 lb 9.6 oz (91 kg)     Wt Readings from Last 3 Encounters:   02/28/17 200 lb 9.6 oz (91 kg)   01/15/16 186 lb 6.4 oz (84.6 kg)   03/18/15 171 lb (77.6 kg)     Body mass index is 33.9 kg/(m^2).    PHYSICAL EXAM:  General Appearance: Alert, cooperative, no distress, appears stated age  HEENT: Pupils equal, symmetric  Lungs: Respirations unlabored  Genitourinary: Right labia minora, there is a slightly red swelling, fluctuant and tender to touch, about 0.5 cm.   Neurologic:  Alert and oriented times 3. Normal reflexes. Cranial nerves II-XII intact.   Psychiatric: Normal mood and affect.    Incision and drainage  Date/Time: 2/28/2017 11:07 AM  Performed by: OLE HEATH  Authorized by: OLE HEATH   Body area: anogenital  Location details: vulva    Anesthesia:  Local Anesthetic: lidocaine 1% without epinephrine   Anesthetic total: 0.25 mL  Sedation:  Patient sedated: no    Patient tolerance: Patient tolerated the procedure well with no immediate complications  Comments: Swelling on right labia minora drained serosanguinous fluid upon incision.         ADDITIONAL HISTORY SUMMARIZED (2): Reviewed 11/12/2016 note from [facility] regarding emesis.   DECISION TO OBTAIN EXTRA INFORMATION (1): None.   RADIOLOGY TESTS (1): None.  LABS (1): Ordered lab.   MEDICINE TESTS (1): None.  INDEPENDENT REVIEW (2 each): None.       The visit lasted a total of 15 minutes face to face with the patient. Over 50% of the time was spent counseling and educating the patient about lump in vaginal area.    Aquiles DELGADO, am scribing for and in the presence of, Dr. Heath.    Dr. Rosalina DELGADO, personally performed the services described in this documentation, as scribed by Aquiles Warren in my presence, and it is both accurate and complete.    MEDICATIONS:  Current Outpatient Prescriptions   Medication Sig  Dispense Refill     albuterol (PROVENTIL HFA;VENTOLIN HFA) 90 mcg/actuation inhaler Inhale 2 puffs every 6 (six) hours as needed for wheezing. 18 g 0     dextroamphetamine-amphetamine (ADDERALL XR) 20 MG 24 hr capsule TK 1 C PO ONCE DAILY  0     venlafaxine (EFFEXOR-XR) 75 MG 24 hr capsule Take 3 capsules by mouth daily.  5     No current facility-administered medications for this visit.        Total data points: 3

## 2021-06-10 NOTE — PROGRESS NOTES
ASSESSMENT:  1. Snoring  - fluticasone (FLONASE) 50 mcg/actuation nasal spray; 1 spray into each nostril daily.  Dispense: 16 g; Refill: 0    2. Healthcare maintenance  - HPV vaccine 9 valent 3 dose IM    PLAN:  There are no Patient Instructions on file for this visit.    Orders Placed This Encounter   Procedures     HPV vaccine 9 valent 3 dose IM     There are no discontinued medications.    No Follow-up on file.     Prescribed Flonase to help with snoring. Patient will also be seen by Sleep Medicine as referred previously. Patient will also get a HPV shot.     CHIEF COMPLAINT:  Chief Complaint   Patient presents with     Snoring     snoring, talks in sleep, night sweats x months       HISTORY OF PRESENT ILLNESS:  Lolly is a 26 y.o. female presenting to the clinic today for evaluation of snoring. She snores a lot at night according to her . She breathes through her mouth. Her  says that she stops breathing and coughs at times during the night. She talks and yells in her sleep. She wakes up at least once during the night. She sometimes feels tired in the morning. She does not have headaches when she wakes up in the morning. She has had night sweats at the back of head for months that she notices when she wakes up in the morning. She stays active, but she does not exercise much. She has slight nasal drainage and sneezing. She does not have seasonal allergies that she knows of. Of note, she has a family history of sleep apnea.    Decreased Concentrating Ability: She takes 20 mg Adderall XR in the morning. She uses 10 mg Adderall later in the day as needed.    Health Maintenance: She is due for an HPV shot today.     REVIEW OF SYSTEMS:   She denies chest pain and shortness of breath. All other systems are negative.    PFSH:  Family: She notes that her mother has tested positive for black mold. Her mother has asthma.     Reviewed, as below.     TOBACCO USE:  History   Smoking Status     Current Every Day  Smoker   Smokeless Tobacco     Never Used       VITALS:  Vitals:    04/18/17 0954   BP: 132/68   Pulse: 96   Weight: 202 lb 12.8 oz (92 kg)     Wt Readings from Last 3 Encounters:   04/18/17 202 lb 12.8 oz (92 kg)   02/28/17 200 lb 9.6 oz (91 kg)   01/15/16 186 lb 6.4 oz (84.6 kg)     Body mass index is 34.27 kg/(m^2).    PHYSICAL EXAM:  General Appearance: Alert, cooperative, no distress, appears stated age  Head: Normocephalic, without obvious abnormality, atraumatic  Eyes: Pupils equal, symmetric  Nose: Turbinates are swollen bilaterally with phlegm.   Throat: Lips, mucosa, and tongue normal; teeth and gums normal  Neck: Supple, symmetrical, trachea midline, no adenopathy;  thyroid: not enlarged, symmetric, no tenderness/mass/nodules  Lungs: Clear to auscultation bilaterally, respirations unlabored  Heart: Regular rate and rhythm, S1 and S2 normal, no murmur, rub, or gallop  Lymph nodes: Cervical nodes normal  Neurologic:  Alert and oriented times 3. Normal reflexes. Cranial nerves II-XII intact.   Psychiatric: Normal mood and affect.      ADDITIONAL HISTORY SUMMARIZED (2): None.  DECISION TO OBTAIN EXTRA INFORMATION (1): None.   RADIOLOGY TESTS (1): None.  LABS (1): Ordered labs.   MEDICINE TESTS (1): None.  INDEPENDENT REVIEW (2 each): None.       The visit lasted a total of 13 minutes face to face with the patient. Over 50% of the time was spent counseling and educating the patient about snoring.    IAquiles, am scribing for and in the presence of, Dr. Romano.    IDr. Romano, personally performed the services described in this documentation, as scribed by Aquiles Warren in my presence, and it is both accurate and complete.    MEDICATIONS:  Current Outpatient Prescriptions   Medication Sig Dispense Refill     albuterol (PROVENTIL HFA;VENTOLIN HFA) 90 mcg/actuation inhaler Inhale 2 puffs every 6 (six) hours as needed for wheezing. 18 g 0     dextroamphetamine-amphetamine (ADDERALL XR) 20 MG 24 hr  capsule TK 1 C PO ONCE DAILY  0     dextroamphetamine-amphetamine (ADDERALL) 10 mg Tab tablet TK 1 T PO DAILY.  0     venlafaxine (EFFEXOR-XR) 75 MG 24 hr capsule Take 3 capsules by mouth daily.  5     fluticasone (FLONASE) 50 mcg/actuation nasal spray 1 spray into each nostril daily. 16 g 0     No current facility-administered medications for this visit.        Total data points: 1

## 2021-06-10 NOTE — PROGRESS NOTES
Dear Dr. Bisi Romano Md  [address] [place] [address]    Thank you for the opportunity to participate in the care of Mrs. Lolly Chang.    She is a 26 y.o. female who comes to the clinic with a chief complaint of excessive daytime sleepiness that is been going on for a couple of years.  Her  has noticed that she has been having significant pauses in her breathing during sleep followed by loud snoring.  She also sometimes feels tired during the day.  Her review of systems is significant for recent prescription changes in her glasses.     Ideal Sleep-Wake Cycle(devoid of societal pressure):    Patient would try to initiate sleep at around 9 PM with a sleep latency of 1 hour. The patient would have 1 nocturnal awakening. Final wake up time is around 9 AM.    Past Medical History  No past medical history on file.     Past Surgical History  Past Surgical History:   Procedure Laterality Date     [place]  DELIVERY ONLY      Description:  Section;  Recorded: 2014;  Comments:  and      [place] LAP,CHOLECYSTECTOMY      Description: Cholecystectomy Laparoscopic;  Recorded: 2014;  Comments:      [place] LIGATE FALLOPIAN TUBE      Description: Tubal Ligation;  Recorded: 2014;  Comments: 2013        Meds  Current Outpatient Prescriptions   Medication Sig Dispense Refill     albuterol (PROVENTIL HFA;VENTOLIN HFA) 90 mcg/actuation inhaler Inhale 2 puffs every 6 (six) hours as needed for wheezing. 18 g 0     dextroamphetamine-amphetamine (ADDERALL XR) 20 MG 24 hr capsule TK 1 C PO ONCE DAILY  0     dextroamphetamine-amphetamine (ADDERALL) 10 mg Tab tablet TK 1 T PO DAILY.  0     fluticasone (FLONASE) 50 mcg/actuation nasal spray 1 spray into each nostril daily. 16 g 0     venlafaxine (EFFEXOR-XR) 75 MG 24 hr capsule Take 3 capsules by mouth daily.  5     No current facility-administered medications for this visit.         Allergies  Escitalopram and  Morphine     Social History  Social History     Social History     Marital status:      Spouse name: N/A     Number of children: N/A     Years of education: N/A     Occupational History     Not on file.     Social History Main Topics     Smoking status: Current Every Day Smoker     Smokeless tobacco: Never Used     Alcohol use Yes     Drug use: Not on file     Sexual activity: Yes     Partners: Male     Birth control/ protection: Surgical     Other Topics Concern     Not on file     Social History Narrative        Family History  Family History   Problem Relation Age of Onset     Snoring Father      Thyroid disease Father      Snoring Paternal Aunt      Snoring Paternal Uncle      Diabetes Paternal Grandmother      Snoring Paternal Grandfather      Review of Systems:  Constitutional: Negative except as noted in HPI.   Eyes: Negative except as noted in HPI.   ENT: Negative except as noted in HPI.   Cardiovascular: Negative except as noted in HPI.   Respiratory: Negative except as noted in HPI.   Gastrointestinal: Negative except as noted in HPI.   Genitourinary: Negative except as noted in HPI.   Musculoskeletal: Negative except as noted in HPI.   Integumentary: Negative except as noted in HPI.   Neurological: Negative except as noted in HPI.   Psychiatric: Negative except as noted in HPI.   Endocrine: Negative except as noted in HPI.   Hematologic/Lymphatic: Negative except as noted in HPI.      STOP BANG 5/15/2017   Do you snore loudly (louder than talking or loud enough to be heard through closed doors)? 1   Do you often feel tired, fatigued, or sleepy during daytime? 1   Has anyone observed you stop breathing in your sleep? 1   Do you have or are you being treated for high blood pressure? 0   BMI more than 35 kg/m2 0   Age over 50 years old? 0   Neck circumference greater than 16 inches? 0   Gender male? 0   Total Score 3   Epworths Sleepiness Scale 5/15/2017   Sitting and reading 2   Watching TV 1  "  Sitting, inactive in a public place (e.g. a theatre or a meeting) 2   As a passenger in a car for an hour without a break 1   Lying down to rest in the afternoon when circumstances permit 2   Sitting and talking to someone 0   Sitting quietly after a lunch without alcohol 1   In a car, while stopped for a few minutes in traffic 0   Total score 9   Rooming 5/15/2017   Usual bedtime 9pm   Sleep Latency 1 hour   Awakenings 1 time   Wake Up Time 7:30a,   Energy Drinks 0   Coffee 0   Cola 3 cans   Difficulty falling asleep Yes   Difficulty staying asleep No   Excessive daytime tiredness Yes   Excessive daytime sleepiness Yes   Dozing off while driving No   Shift Worker No   Sleep Walking? No   Sleep Talking? Yes   Kicking or punching? Yes   Restless legs symptoms No       Physical Exam:  /60  Pulse 95  Ht 5' 4.5\" (1.638 m)  Wt 202 lb 6.4 oz (91.8 kg)  SpO2 98%  BMI 34.21 kg/m2  BMI:Body mass index is 34.21 kg/(m^2).   GEN: NAD, obesity  Head: Normocephalic.  EYES: PERRLA, EOMI  ENT: Oropharynx is clear, mallampatti class 3+ airway. Uvula is intact  Nasal mucosa is moist without erythema  Neck : Thyroid is within normal limits. Neck 14 inches.  CV: Regular rate and rhythm, S1 & S2 positive.  LUNGS: Bilateral breathsounds heard.   ABDOMEN: Positive bowel sounds in all quadrants, soft, no rebound or guarding  MUSCULOSKELETAL: No leg swelling  SKIN: warm, dry, no rashes  Neurological: Alert, oriented to time, place, and person.  Psych: normal mood, normal affect        Labs/Studies:     Lab Results   Component Value Date    WBC 8.1 11/18/2015    HGB 13.7 11/18/2015    HCT 41.5 11/18/2015    MCV 90 11/18/2015     11/18/2015         Chemistry        Component Value Date/Time     11/12/2016 2059    K 3.9 11/12/2016 2059     11/12/2016 2059    CO2 24 11/12/2016 2059    BUN 6 (L) 11/12/2016 2059    CREATININE 0.72 11/12/2016 2059    GLU 92 11/12/2016 2059        Component Value Date/Time    CALCIUM " 9.8 11/12/2016 2059    ALKPHOS 70 11/18/2015 1530    AST 20 11/18/2015 1530    ALT 15 11/18/2015 1530    BILITOT 0.4 11/18/2015 1530            No results found for: FERRITIN  Lab Results   Component Value Date    TSH 1.09 11/20/2014         Assessment and Plan:  In summary Lolly Chang is a 26 y.o. year old female here for sleep disturbance.  1.  Suspected sleep apnea   Mrs. Lolly Chang has high risk for obstructive sleep apnea based on the history of witnessed apnea, snoring and a crowded airway. I educated the patient on the underlying pathophysiology of obstructive sleep apnea using educational slides. We reviewed the risks associated with sleep apnea, including increased cardiovascular risk and overall death. We talked about treatment options in general. I recommend getting an split-night nocturnal polysomnography. The patient should return to the clinic to discuss results and treatment option in a patient-centered approach.  2.  Hypersomnia  3.  Snoring  4.  Other sleep disturbance    Patient verbalized understanding of these issues, agrees with the plan and all questions were answered today. Patient was given an opportuntity to voice any other symptoms or concerns not listed above. Patient did not have any other symptoms or concerns.      Patient told to return in one week after the sleep study is interpreted. Patient instructed to stop at  to schedule appointment before leaving today.      Cortez Olmstead DO  Board Certified in Internal Medicine and Sleep Medicine  [facility].    (Note created with Dragon voice recognition and unintended spelling errors and word substitutions may occur)

## 2021-06-16 PROBLEM — R00.0 TACHYCARDIA: Status: ACTIVE | Noted: 2018-09-27

## 2021-06-16 PROBLEM — F14.10 COCAINE ABUSE (H): Status: ACTIVE | Noted: 2018-09-26

## 2021-06-16 PROBLEM — F14.929: Status: ACTIVE | Noted: 2018-09-26

## 2021-06-20 NOTE — PROGRESS NOTES
"ASSESSMENT/PLAN:    Tachycardia  28 y.o. Female with depression/anxiety presented because of palpitation/tachycardia.  EKG was unremarkable.  TSH/T4 were consistent with hyperthyroidism.  Will treat with methimazole.  Stop Adderall (which she has done for the last few months).  I'd like her to f/u with PCP in 1 month.    -     Electrocardiogram Perform - Clinic  -     Thyroid Stimulating Hormone (TSH)  -     T4, Free  -     HM2(CBC w/o Differential)  -     Comprehensive Metabolic Panel    SUBJECTIVE:    Lolly Chang is a 28 y.o. female who came in today for palpitation.  Over the last week she has noted \"fluttery\" sensation in her chest.  She has noted more shaking of her hands and intermittent shortness of breath.  Denies chest pain, fever, chills, myalgia, malaise, cough, wheeze, heartburn, stomach pain, nausea, vomiting.  Has anxiety & depression and takes effexor.  Has ADHD but has not taken adderall for 4 months (unbeknownst to her psychiatrist).  Sees psychiatrist every month.  Smokes 6 sticks cigarettes daily.  No ETOH or illegal drug use.  No THC.  Normal menstrual cycle.  LMP was earlier this month.  No h/o CAD.  No h/o pulmonary conditions.  H/o Tubal ligation.  Denies worsening anxiety/depression as the cause for palpitation. FMH of thyroid dysfunction.      Review of Systems (except those mentioned above)  Constitutional: Negative.   HENT: Negative.   Eyes: Negative.   Respiratory: Negative.   Cardiovascular: Negative.   Gastrointestinal: Negative.   Endocrine: Negative.   Genitourinary: Negative.   Musculoskeletal: Negative.   Skin: Negative.   Allergic/Immunologic: Negative.   Neurological: Negative.   Hematological: Negative.   Psychiatric/Behavioral: Negative.     Patient Active Problem List    Diagnosis Date Noted     Polycystic disease, ovaries 04/01/2015     Amenorrhea 03/18/2015     Abdominal pain, other specified site 03/18/2015     History of acute PID 01/26/2015     Vaginitis      " Vaginal Discharge      Urinary Frequency Increased      Urinary Tract Infection      Lower Back Pain      Pain During Urination (Dysuria)      Decreased Concentrating Ability      Major Depression, Recurrent      Generalized Anxiety Disorder      Acute Upper Respiratory Infection      Ganglion Of The Flexor Sheath Of The Right Thumb      Allergies   Allergen Reactions     Escitalopram Hives     Morphine      Current Outpatient Prescriptions   Medication Sig Dispense Refill     albuterol (PROVENTIL HFA;VENTOLIN HFA) 90 mcg/actuation inhaler Inhale 2 puffs every 6 (six) hours as needed for wheezing. 18 g 0     dextroamphetamine-amphetamine (ADDERALL XR) 20 MG 24 hr capsule TK 1 C PO ONCE DAILY  0     dextroamphetamine-amphetamine (ADDERALL) 10 mg Tab tablet TK 1 T PO DAILY.  0     venlafaxine (EFFEXOR-XR) 75 MG 24 hr capsule Take 3 capsules by mouth daily.  5     No current facility-administered medications for this visit.      No past medical history on file.  Past Surgical History:   Procedure Laterality Date     [place]  DELIVERY ONLY      Description:  Section;  Recorded: 2014;  Comments:  and      [place] LAP,CHOLECYSTECTOMY      Description: Cholecystectomy Laparoscopic;  Recorded: 2014;  Comments:      [place] LIGATE FALLOPIAN TUBE      Description: Tubal Ligation;  Recorded: 2014;  Comments:      Social History     Social History     Marital status:      Spouse name: N/A     Number of children: N/A     Years of education: N/A     Social History Main Topics     Smoking status: Current Every Day Smoker     Smokeless tobacco: Never Used     Alcohol use Yes     Drug use: Not on file     Sexual activity: Yes     Partners: Male     Birth control/ protection: Surgical     Other Topics Concern     Not on file     Social History Narrative     Family History   Problem Relation Age of Onset     Snoring Father      Thyroid disease Father      Snoring Paternal Aunt       Snoring Paternal Uncle      Diabetes Paternal Grandmother      Snoring Paternal Grandfather          OBJECTIVE:    Vitals:    09/20/18 1326   BP: 114/74   Pulse: (!) 112   SpO2: 97%   Weight: 170 lb 1 oz (77.1 kg)     Body mass index is 29.19 kg/(m^2).    Physical Exam:  Constitutional: Patient was oriented to person, place, and time. Patient appeared well-developed and well-nourished. No distress.   Head: Normocephalic and atraumatic.   Right Ear: External ear normal. Normal TM  Left Ear: External ear normal. Normal TM  Nose: Nose normal.   Mouth/Throat: Oropharynx was clear and moist. No oropharyngeal exudate.   Eyes: Conjunctivae and EOM were normal. Pupils were equal, round, and reactive to light. Right eye exhibited no discharge. Left eye exhibited no discharge. No scleral icterus.   Neck: Neck supple. No JVD present. No tracheal deviation present. No thyromegaly present.   Lymphadenopathy:  Patient has no cervical adenopathy.   Cardiovascular: tachycardia rate, regular rhythm, normal heart sounds and intact distal pulses. No murmur heard.   Pulmonary/Chest: Effort normal and breath sounds normal. No stridor. No respiratory distress. Patient had no wheezes, no rales, exhibits no tenderness.   Skin: Skin was warm and dry. No rash noted. Patient was not diaphoretic. No erythema. No pallor.       Results for orders placed or performed in visit on 09/20/18   Thyroid Stimulating Hormone (TSH)   Result Value Ref Range    TSH <0.01 (L) 0.30 - 5.00 uIU/mL   T4, Free   Result Value Ref Range    Free T4 3.1 (H) 0.7 - 1.8 ng/dL   HM2(CBC w/o Differential)   Result Value Ref Range    WBC 6.4 4.0 - 11.0 thou/uL    RBC 5.38 3.80 - 5.40 mill/uL    Hemoglobin 14.5 12.0 - 16.0 g/dL    Hematocrit 42.7 35.0 - 47.0 %    MCV 79 (L) 80 - 100 fL    MCH 27.0 27.0 - 34.0 pg    MCHC 34.0 32.0 - 36.0 g/dL    RDW 10.8 (L) 11.0 - 14.5 %    Platelets 252 140 - 440 thou/uL    MPV 8.2 7.0 - 10.0 fL   Comprehensive Metabolic Panel   Result  Value Ref Range    Sodium 136 136 - 145 mmol/L    Potassium 4.0 3.5 - 5.0 mmol/L    Chloride 103 98 - 107 mmol/L    CO2 23 22 - 31 mmol/L    Anion Gap, Calculation 10 5 - 18 mmol/L    Glucose 147 (H) 70 - 125 mg/dL    BUN 7 (L) 8 - 22 mg/dL    Creatinine 0.54 (L) 0.60 - 1.10 mg/dL    GFR MDRD Af Amer >60 >60 mL/min/1.73m2    GFR MDRD Non Af Amer >60 >60 mL/min/1.73m2    Bilirubin, Total 0.4 0.0 - 1.0 mg/dL    Calcium 9.8 8.5 - 10.5 mg/dL    Protein, Total 7.3 6.0 - 8.0 g/dL    Albumin 3.4 (L) 3.5 - 5.0 g/dL    Alkaline Phosphatase 114 45 - 120 U/L    AST 27 0 - 40 U/L    ALT 25 0 - 45 U/L   Electrocardiogram Perform - Clinic   Result Value Ref Range    SYSTOLIC BLOOD PRESSURE  mmHg    DIASTOLIC BLOOD PRESSURE  mmHg    VENTRICULAR RATE 96 BPM    ATRIAL RATE 96 BPM    P-R INTERVAL 118 ms    QRS DURATION 86 ms    Q-T INTERVAL 344 ms    QTC CALCULATION (BEZET) 434 ms    P Axis 59 degrees    R AXIS 49 degrees    T AXIS 54 degrees    MUSE DIAGNOSIS       Sinus rhythm  Normal ECG  No previous ECGs available  Confirmed by JUDD CARDONA MD LOC:[facility] ([id number]) on 9/20/2018 2:48:49 PM

## 2021-06-20 NOTE — PROGRESS NOTES
Second attempt to reach patient for hospital discharge follow up.  No answer. no optioin for voice mail. RN has made two unsuccessful attempts to reach patient.   Encounter closed.

## 2021-06-20 NOTE — PROGRESS NOTES
TCM DISCHARGE FOLLOW UP CALL Hospital discharge follow up call to pt, no answer. Unable to LVM as no option. Other phone number not in service. RN will attempt call back at another time.

## 2021-06-21 NOTE — PROGRESS NOTES
"ASSESSMENT/PLAN:    Hyperthyroidism  Hyperthyroidism diagnosed September 2018.  She has been out of her medication for the last week.  I will refill tramadol 5 mg 3 times a day for her to take.  Come back in 2 months to see her primary care provider for TSH and T4 and med check.  The patient verbalized understanding and agree with the plan  -     methIMAzole (TAPAZOLE) 5 MG tablet; TAKE 1 TABLET(5 MG) BY MOUTH THREE TIMES DAILY.  Dispense: 270 tablet; Refill: 0    Generalized anxiety disorder, Moderate episode of recurrent major depressive disorder (H)  The patient is being followed by psychiatry.  She takes Pristiq 50 mg.    Attention deficit hyperactivity disorder (ADHD), unspecified ADHD type  Patient is being followed by psychiatry.  She takes Adderall XR 30 mg and Adderall immediate release 10 mg.  I advised that she may want to consider stopping Adderall temporarily due to her tachycardia secondary to hyperthyroidism.  It may be in her best interest to get her hypothyroidism under better control before taking a stimulant.    SUBJECTIVE:  Lolly Chang is a 28 y.o. female who came in today for hospital discharge follow up. Patient is feeling fatigued today, explaining that \"all she wants to do is sleep.\" She states she has not had her thyroid medication (Pristiq 50mg) for at least a week and was feeling much better when she was taking her medication. Patient sees her psychologist every 2 weeks and psychiatrist once a month.    Hospitalization dates: 9/26/2018-9/28/2018  Diagnosis: Tachycardia    Allergies   Allergen Reactions     Escitalopram Hives     Morphine      Current Outpatient Medications   Medication Sig Dispense Refill     desvenlafaxine succinate (PRISTIQ) 50 MG 24 hr tablet Take 1 tablet (50 mg total) by mouth daily. 30 tablet 0     dextroamphetamine-amphetamine (ADDERALL XR) 30 MG 24 hr capsule Take 30 mg by mouth daily.       dextroamphetamine-amphetamine (ADDERALL) 10 mg Tab tablet Take " 10 mg by mouth daily as needed.       methIMAzole (TAPAZOLE) 5 MG tablet TAKE 1 TABLET(5 MG) BY MOUTH THREE TIMES DAILY. 270 tablet 0     No current facility-administered medications for this visit.      No past medical history on file.  Past Surgical History:   Procedure Laterality Date     [place]  DELIVERY ONLY      Description:  Section;  Recorded: 2014;  Comments:  and      [place] LAP,CHOLECYSTECTOMY      Description: Cholecystectomy Laparoscopic;  Recorded: 2014;  Comments:      [place] LIGATE FALLOPIAN TUBE      Description: Tubal Ligation;  Recorded: 2014;  Comments:      Social History     Socioeconomic History     Marital status:      Spouse name: None     Number of children: None     Years of education: None     Highest education level: None   Social Needs     Financial resource strain: None     Food insecurity - worry: None     Food insecurity - inability: None     Transportation needs - medical: None     Transportation needs - non-medical: None   Occupational History     None   Tobacco Use     Smoking status: Current Every Day Smoker     Smokeless tobacco: Never Used   Substance and Sexual Activity     Alcohol use: Yes     Drug use: None     Sexual activity: Yes     Partners: Male     Birth control/protection: Surgical   Other Topics Concern     None   Social History Narrative     None     Family History   Problem Relation Age of Onset     Snoring Father      Thyroid disease Father      Snoring Paternal Aunt      Snoring Paternal Uncle      Diabetes Paternal Grandmother      Snoring Paternal Grandfather      Review of Systems   Constitutional: Negative.   HENT: Negative.   Eyes: Negative.   Respiratory: Negative.   Cardiovascular: Negative.   Gastrointestinal: Negative.   Endocrine: Negative.   Genitourinary: Negative.   Musculoskeletal: Negative.   Skin: Negative.   Allergic/Immunologic: Negative.   Neurological: Negative.   Hematological: Negative.    Psychiatric/Behavioral: Negative.     OBJECTIVE:     Vitals:    11/27/18 1321   BP: 120/78   Patient Site: Left Arm   Patient Position: Sitting   Cuff Size: Adult Regular   Pulse: (!) 116   SpO2: 97%   Weight: 177 lb (80.3 kg)       Physical Exam:  Constitutional: Patient is oriented to person, place, and time. Patient appears well-developed and well-nourished. No distress.   Head: Normocephalic and atraumatic.   Right Ear: External ear normal.   Left Ear: External ear normal.   Nose: Nose normal.   Eyes: Conjunctivae and EOM are normal. Right eye exhibits no discharge. Left eye exhibits no discharge. No scleral icterus.   Neurological: Patient is alert and oriented to person, place, and time. Patient has normal reflexes. No cranial nerve deficit. Coordination normal.   Skin: No rash noted. Patient is not diaphoretic. No erythema. No pallor.    Results for orders placed or performed during the hospital encounter of 09/26/18   Basic Metabolic Panel   Result Value Ref Range    Sodium 135 (L) 136 - 145 mmol/L    Potassium 4.0 3.5 - 5.0 mmol/L    Chloride 107 98 - 107 mmol/L    CO2 23 22 - 31 mmol/L    Anion Gap, Calculation 5 5 - 18 mmol/L    Glucose 103 70 - 125 mg/dL    Calcium 9.1 8.5 - 10.5 mg/dL    BUN 8 8 - 22 mg/dL    Creatinine 0.49 (L) 0.60 - 1.10 mg/dL    GFR MDRD Af Amer >60 >60 mL/min/1.73m2    GFR MDRD Non Af Amer >60 >60 mL/min/1.73m2   Thyroid Stimulating Hormone (TSH)   Result Value Ref Range    TSH <0.01 (L) 0.30 - 5.00 uIU/mL   T3 (Triiodthyronine), Free   Result Value Ref Range    T3, Free 19.1 (H) 1.9 - 3.9 pg/mL   T4, Free   Result Value Ref Range    Free T4 3.1 (H) 0.7 - 1.8 ng/dL   Drug Abuse 1+, Urine   Result Value Ref Range    Amphetamines (!) Screen Negative     Screen Positive (Confirmation available on request)    Benzodiazepines Screen Negative Screen Negative    Opiates Screen Negative Screen Negative    Phencyclidine Screen Negative Screen Negative    THC Screen Negative Screen  Negative    Barbiturates Screen Negative Screen Negative    Cocaine Metabolite (!) Screen Negative     Screen Positive (Confirmation available on request)    Methadone Screen Negative Screen Negative    Oxycodone Screen Negative Screen Negative    Creatinine, Urine 94.6 mg/dL   Urinalysis-UC if Indicated   Result Value Ref Range    Color, UA Yellow Colorless, Yellow, Straw, Light Yellow    Clarity, UA Clear Clear    Glucose, UA Negative Negative    Bilirubin, UA Negative Negative    Ketones, UA 40 mg/dL (!) Negative    Specific Gravity, UA 1.017 1.001 - 1.030    Blood, UA Negative Negative    pH, UA 6.0 4.5 - 8.0    Protein, UA Negative Negative mg/dL    Urobilinogen, UA <2.0 E.U./dL <2.0 E.U./dL, 2.0 E.U./dL    Nitrite, UA Negative Negative    Leukocytes, UA Negative Negative   HM1 (CBC with Diff)   Result Value Ref Range    WBC 5.7 4.0 - 11.0 thou/uL    RBC 4.77 3.80 - 5.40 mill/uL    Hemoglobin 12.8 12.0 - 16.0 g/dL    Hematocrit 37.3 35.0 - 47.0 %    MCV 78 (L) 80 - 100 fL    MCH 26.8 (L) 27.0 - 34.0 pg    MCHC 34.3 32.0 - 36.0 g/dL    RDW 12.4 11.0 - 14.5 %    Platelets 218 140 - 440 thou/uL    MPV 10.4 8.5 - 12.5 fL    Neutrophils % 57 50 - 70 %    Lymphocytes % 30 20 - 40 %    Monocytes % 13 (H) 2 - 10 %    Eosinophils % 0 0 - 6 %    Basophils % 0 0 - 2 %    Neutrophils Absolute 3.2 2.0 - 7.7 thou/uL    Lymphocytes Absolute 1.7 0.8 - 4.4 thou/uL    Monocytes Absolute 0.7 0.0 - 0.9 thou/uL    Eosinophils Absolute 0.0 0.0 - 0.4 thou/uL    Basophils Absolute 0.0 0.0 - 0.2 thou/uL   Hepatic Profile   Result Value Ref Range    Bilirubin, Total 0.5 0.0 - 1.0 mg/dL    Bilirubin, Direct 0.2 <=0.5 mg/dL    Protein, Total 6.5 6.0 - 8.0 g/dL    Albumin 2.9 (L) 3.5 - 5.0 g/dL    Alkaline Phosphatase 98 45 - 120 U/L    AST 32 0 - 40 U/L    ALT 24 0 - 45 U/L   HCG, Quant   Result Value Ref Range    Beta-hCG, Quantitative <2 0 - 4 mlU/mL   ECG 12 lead with MUSE   Result Value Ref Range    SYSTOLIC BLOOD PRESSURE 130 mmHg     DIASTOLIC BLOOD PRESSURE 81 mmHg    VENTRICULAR RATE 98 BPM    ATRIAL RATE 98 BPM    P-R INTERVAL 104 ms    QRS DURATION 90 ms    Q-T INTERVAL 348 ms    QTC CALCULATION (BEZET) 444 ms    P Axis 43 degrees    R AXIS 50 degrees    T AXIS 38 degrees    MUSE DIAGNOSIS       Sinus rhythm with short VA  Otherwise normal ECG  When compared with ECG of 20-SEP-2018 14:05,  No significant change was found  Confirmed by FAYE XAVIER MD LOC:[facility] ([id number]) on 9/26/2018 3:39:12 PM       ADDITIONAL HISTORY SUMMARIZED (2): Discharge summary from September 2018 reviewed and summarized  DECISION TO OBTAIN EXTRA INFORMATION (1): None.   RADIOLOGY TESTS (1): None.  LABS (1): Lab results from November 2018, and September 2018 reviewed  MEDICINE TESTS (1):   INDEPENDENT REVIEW (2 each): None.     Total data points = 3    Total time was 15 minutes, greater than 50% counseling and coordinating care regarding the above issues.    By signing my name below, I, Sridevi Douglass, attest that this documentation has been prepared under the direction and in the presence of Dr. Rhiannon Childers.  Electronic Signature: Hua Hawley. 11/27/2018 13:53.    I, Dr. Ilene Breaux MD , personally performed the services described in this documentation. All medical record entries made by the scribe were at my direction and in my presence. I have reviewed the chart and discharge instructions (if applicable) and agree that the record reflects my personal performance and is accurate and complete.

## 2023-03-14 ENCOUNTER — HOSPITAL ENCOUNTER (EMERGENCY)
Facility: CLINIC | Age: 33
Discharge: HOME | End: 2023-03-14
Attending: EMERGENCY MEDICINE | Admitting: EMERGENCY MEDICINE
Payer: COMMERCIAL

## 2023-03-14 VITALS
BODY MASS INDEX: 29.88 KG/M2 | OXYGEN SATURATION: 97 % | RESPIRATION RATE: 18 BRPM | HEART RATE: 95 BPM | DIASTOLIC BLOOD PRESSURE: 97 MMHG | HEIGHT: 64 IN | SYSTOLIC BLOOD PRESSURE: 132 MMHG | WEIGHT: 175 LBS | TEMPERATURE: 97.6 F

## 2023-03-14 DIAGNOSIS — Z59.00 LACK OF HOUSING: ICD-10-CM

## 2023-03-14 DIAGNOSIS — U07.1 INFECTION DUE TO 2019 NOVEL CORONAVIRUS: ICD-10-CM

## 2023-03-14 DIAGNOSIS — R45.851 SUICIDAL IDEATION: ICD-10-CM

## 2023-03-14 LAB
FLUAV RNA SPEC QL NAA+PROBE: NEGATIVE
FLUBV RNA RESP QL NAA+PROBE: NEGATIVE
RSV RNA SPEC NAA+PROBE: NEGATIVE
SARS-COV-2 RNA RESP QL NAA+PROBE: POSITIVE

## 2023-03-14 PROCEDURE — 87637 SARSCOV2&INF A&B&RSV AMP PRB: CPT | Performed by: EMERGENCY MEDICINE

## 2023-03-14 PROCEDURE — 99283 EMERGENCY DEPT VISIT LOW MDM: CPT | Mod: CS | Performed by: EMERGENCY MEDICINE

## 2023-03-14 PROCEDURE — 250N000013 HC RX MED GY IP 250 OP 250 PS 637: Performed by: EMERGENCY MEDICINE

## 2023-03-14 PROCEDURE — 99283 EMERGENCY DEPT VISIT LOW MDM: CPT | Mod: CS

## 2023-03-14 PROCEDURE — C9803 HOPD COVID-19 SPEC COLLECT: HCPCS

## 2023-03-14 RX ORDER — IBUPROFEN 600 MG/1
600 TABLET, FILM COATED ORAL ONCE
Status: COMPLETED | OUTPATIENT
Start: 2023-03-14 | End: 2023-03-14

## 2023-03-14 RX ADMIN — IBUPROFEN 600 MG: 600 TABLET, FILM COATED ORAL at 06:29

## 2023-03-14 SDOH — ECONOMIC STABILITY - HOUSING INSECURITY: HOMELESSNESS UNSPECIFIED: Z59.00

## 2023-03-14 ASSESSMENT — ACTIVITIES OF DAILY LIVING (ADL)
ADLS_ACUITY_SCORE: 35
ADLS_ACUITY_SCORE: 33

## 2023-03-14 NOTE — ED PROVIDER NOTES
ED Provider Note  [facility]      History     Chief Complaint   Patient presents with     Flu Symptoms     Suicidal     HPI  Lolly Chang is a 32 year old female who has a history of ADHD, depression, PTSD, anxiety who presents to the emergency department for evaluation of flulike symptom.  Patient complains of flulike symptoms for the past 1 weeks. Patient complains of headache, runny nose, nasal congestion, cough.  Patient states she is currently homeless, staying on the streets, unable to get into a shelter.  No medications prior to arrival.  Patient requesting apple juice and lyndsey crackers upon arrival.  Patient denies any chest pain, shortness of breath, nausea, vomiting, diarrhea.  Patient endorses suicidal ideation with no specific plan to triage nurse however during my evaluation patient denies any suicidal ideation, homicide ideation, or intent to self-harm.  Patient denies any tobacco, alcohol, substance abuse.    Past Medical History  No past medical history on file.  Past Surgical History:   Procedure Laterality Date     [facility]  DELIVERY ONLY      Description:  Section;  Recorded: 2014;  Comments:  and      [facility] LAP,CHOLECYSTECTOMY/EXPLORE      Description: Cholecystectomy Laparoscopic;  Recorded: 2014;  Comments:      [facility] LIGATE FALLOPIAN TUBE      Description: Tubal Ligation;  Recorded: 2014;  Comments:      amphetamine-dextroamphetamine (ADDERALL XR) 30 MG 24 hr capsule  amphetamine-dextroamphetamine (ADDERALL) 10 MG tablet  hydrOXYzine (VISTARIL) 50 MG capsule  methimazole (TAPAZOLE) 10 MG tablet  methimazole (TAPAZOLE) 10 MG tablet  propranolol (INDERAL) 20 MG tablet  propranolol (INDERAL) 20 MG tablet  traZODone (DESYREL) 50 MG tablet      Allergies   Allergen Reactions     Morphine      Escitalopram Hives and Rash     Family History  Family History   Problem Relation Age of Onset     Depression Mother       "Anxiety Disorder Mother      Schizophrenia Mother      Bipolar Disorder Mother      Substance Abuse Mother      Substance Abuse Father      Depression Father      Depression Sister      Anxiety Disorder Sister      Suicide Sister      Substance Abuse Sister      Depression Sister      Anxiety Disorder Sister      Depression Paternal Half-Sister      Snoring Father      Thyroid Disease Father      Snoring Paternal Aunt      Snoring Paternal Uncle      Diabetes Paternal Grandmother      Snoring Paternal Grandfather      Social History   Social History     Tobacco Use     Smoking status: Every Day     Packs/day: 1.00     Types: Cigarettes     Smokeless tobacco: Never      Past medical history, past surgical history, medications, allergies, family history, and social history were reviewed with the patient. No additional pertinent items.      A medically appropriate review of systems was performed with pertinent positives and negatives noted in the HPI, and all other systems negative.    Physical Exam   BP: (!) 132/97  Pulse: 95  Temp: 97.6  F (36.4  C)  Resp: 18  Height: 162.6 cm (5' 4\")  Weight: 79.4 kg (175 lb)  SpO2: 97 %  Physical Exam  General: Afebrile, no acute distress   HEENT: Normocephalic, atraumatic, conjunctivae normal. MMM  Neck: non-tender, supple  Cardio: regular rate. regular rhythm   Resp: Normal work of breathing, no respiratory distress, lungs clear bilaterally, no wheezing, rhonchi, rales  Chest/Back: no visual signs of trauma, no CVA tenderness   Abdomen: soft, non distension, no tenderness, no peritoneal signs   Neuro: alert and fully oriented. CN II-XII grossly intact. Grossly normal strength and sensation in all extremities.   MSK: no deformities. Normal range of motion  Integumentary/Skin: no rash visualized, normal color  Psych: normal affect, normal behavior      ED Course, Procedures, & Data      Procedures    Mental Health Risk Assessment      PSS-3    Date and Time Over the past 2 weeks " have you felt down, depressed, or hopeless? Over the past 2 weeks have you had thoughts of killing yourself? Have you ever attempted to kill yourself? When did this last happen? User   03/14/23 0447 yes yes no --       C-SSRS ([place])    Date and Time Q1 Wished to be Dead (Past Month) Q2 Suicidal Thoughts (Past Month) Q3 Suicidal Thought Method Q4 Suicidal Intent without Specific Plan Q5 Suicide Intent with Specific Plan Q6 Suicide Behavior (Lifetime) Within the Past 3 Months? RETIRED: Level of Risk per Screen Screening Not Complete User   03/14/23 0447 yes yes no yes no yes -- -- -- JR              Suicide assessment completed by mental health (D.E.C., LCSW, etc.)       Results for orders placed or performed during the hospital encounter of 03/14/23   Symptomatic Influenza A/B, RSV, & SARS-CoV2 PCR (COVID-19) Nose     Status: Abnormal    Specimen: Nose; Swab   Result Value Ref Range    Influenza A PCR Negative Negative    Influenza B PCR Negative Negative    RSV PCR Negative Negative    SARS CoV2 PCR Positive (A) Negative    Narrative    Testing was performed using the Xpert Xpress CoV2/Flu/RSV Assay on the [employer] GeneXpert Instrument. This test should be ordered for the detection of SARS-CoV-2, influenza, and RSV viruses in individuals who meet clinical and/or epidemiological criteria. Test performance is unknown in asymptomatic patients. This test is for in vitro diagnostic use under the FDA EUA for laboratories certified under CLIA to perform high or moderate complexity testing. This test has not been FDA cleared or approved. A negative result does not rule out the presence of PCR inhibitors in the specimen or target RNA in concentration below the limit of detection for the assay. If only one viral target is positive but coinfection with multiple targets is suspected, the sample should be re-tested with another FDA cleared, approved, or authorized test, if coinfection would change clinical management. This  test was validated by the [facility]. These laboratories are certified under the Clinical Laboratory Improvement Amendments of 1988 (CLIA-88) as qualified to perform high complexity laboratory testing.     Medications   ibuprofen (ADVIL/MOTRIN) tablet 600 mg (600 mg Oral $Given 3/14/23 0633)     Labs Ordered and Resulted from Time of ED Arrival to Time of ED Departure   INFLUENZA A/B, RSV, & SARS-COV2 PCR - Abnormal       Result Value    Influenza A PCR Negative      Influenza B PCR Negative      RSV PCR Negative      SARS CoV2 PCR Positive (*)      No orders to display          Critical care was not performed.     Medical Decision Making  The patient's presentation was of low complexity (an acute and uncomplicated illness or injury).    The patient's evaluation involved:  ordering and/or review of 3+ test(s) in this encounter (see separate area of note for details)  discussion of management or test interpretation with another health professional (Behavioral health )    The patient's management necessitated moderate risk (limitations due to social determinants of health (see separate area of note for details)) and further care after sign-out to Morning provider, Dr. Patricia (see their note for further management).      Assessment & Plan    Lolly Chang is a 32 year old female who has a history of ADHD, depression, PTSD, anxiety who presents to the emergency department for evaluation of flulike symptoms as well as suicidal ideation.  Upon arrival patient is nontoxic-appearing, afebrile, no distress.  Patient hemodynamically stable vital signs within normal limits.  Patient here with flulike symptoms for the past 1 week differential diagnosis includes but is not limited to viral illness versus COVID versus RSV versus influenza versus less likely pneumonia.  Patient afebrile, no shortness of breath, no tachycardia, no tachypnea, no respiratory distress.     Covid Positive, negative  influenza, negative RSV.  Recommend continued supportive care with rest, oral hydration, Tylenol and ibuprofen for symptoms.    Patient pending behavioral health assessment at the time of signout.  Patient signed out to morning provider Dr. Patricia     I have reviewed the nursing notes. I have reviewed the findings, diagnosis, plan and need for follow up with the patient.    New Prescriptions    No medications on file       Final diagnoses:   Infection due to 2019 novel coronavirus       Jennifer Ryan MD  [facility] EMERGENCY DEPARTMENT  3/14/2023     Jennifer Ryan MD  03/14/23 0711

## 2023-03-14 NOTE — ED PROVIDER NOTES
"I assumed care of this patient from my colleague at 7 AM pending DEC assessment.  32-year-old female with history seen for ADHD, depression, PTSD and anxiety who arrives today to the emergency department with an influenza-like illness.  She was found to be COVID-positive.  Symptoms not present for 1 week.  She is not a candidate for antiviral medication.  Patient did express some concern about fleeting suicide however to the previous provider denied this.  I have reviewed the patient's vital signs noting patient to be afebrile and hemodynamically stable.    Shortly after this patient was signed over to me she has requested dismissal from the emergency department.  I did go to assess this patient.  She adamantly denies any SI or thoughts of self-harm.  The patient states \"I just wanted to sleep\".  I do not believe she is holdable at this time.  She demonstrates no signs of overt psychosis, withdrawal symptoms or other toxicosis.  Certainly did offer to keep the patient here for evaluation by DEC.  Patient has declined.  We are certainly happy to reevaluate this patient at any time should she want further assistance.  I did discuss with her further about her coronavirus positive testing, symptomatic management, self-isolation and time to return to public.     Mario Patricia MD  03/14/23 0837    "

## 2023-03-14 NOTE — ED TRIAGE NOTES
Pt BIBA from apartment lobby with C/O productive cough, runny nose, body aches and HA x 1 week.  Denies N/V/D, CP, dizziness or difficulty breathing.  VSS, pt is alert x 4, afebrile and in no apparent distress, however endorse SI with intent and no plan.     Triage Assessment     Row Name 03/14/23 0445       Triage Assessment (Adult)    Airway WDL WDL       Respiratory WDL    Respiratory WDL WDL       Skin Circulation/Temperature WDL    Skin Circulation/Temperature WDL WDL       Cardiac WDL    Cardiac WDL WDL       Peripheral/Neurovascular WDL    Peripheral Neurovascular WDL WDL       Cognitive/Neuro/Behavioral WDL    Cognitive/Neuro/Behavioral WDL WDL